# Patient Record
Sex: FEMALE | Race: ASIAN | Employment: PART TIME | URBAN - METROPOLITAN AREA
[De-identification: names, ages, dates, MRNs, and addresses within clinical notes are randomized per-mention and may not be internally consistent; named-entity substitution may affect disease eponyms.]

---

## 2017-05-03 ENCOUNTER — TELEPHONE (OUTPATIENT)
Dept: FAMILY MEDICINE CLINIC | Facility: CLINIC | Age: 47
End: 2017-05-03

## 2017-05-04 ENCOUNTER — OFFICE VISIT (OUTPATIENT)
Dept: FAMILY MEDICINE CLINIC | Facility: CLINIC | Age: 47
End: 2017-05-04

## 2017-05-04 ENCOUNTER — TELEPHONE (OUTPATIENT)
Dept: FAMILY MEDICINE CLINIC | Facility: CLINIC | Age: 47
End: 2017-05-04

## 2017-05-04 VITALS
DIASTOLIC BLOOD PRESSURE: 68 MMHG | HEART RATE: 58 BPM | SYSTOLIC BLOOD PRESSURE: 101 MMHG | BODY MASS INDEX: 25.72 KG/M2 | WEIGHT: 138 LBS | TEMPERATURE: 98 F | HEIGHT: 61.5 IN | RESPIRATION RATE: 18 BRPM

## 2017-05-04 DIAGNOSIS — R10.30 LOWER ABDOMINAL PAIN: ICD-10-CM

## 2017-05-04 PROCEDURE — 99212 OFFICE O/P EST SF 10 MIN: CPT | Performed by: FAMILY MEDICINE

## 2017-05-04 PROCEDURE — 99213 OFFICE O/P EST LOW 20 MIN: CPT | Performed by: FAMILY MEDICINE

## 2017-05-04 NOTE — TELEPHONE ENCOUNTER
Actions Requested: Pt is calling for further testing, unsure if  can order without seeing her - Pt was advised to go to the ED, declines at this time. Scheduled for appt at 4:20pm today.  Please advise with your recommendations Dr. Keara Bonner clinic contact information, hours of operation and will call back if needed. Patient was able to restate instructions in their own words, verbalizes understanding and agrees with plan.   Protocol Used: Abdominal Pain - Female (Adult)  Protocol-Based Disposi

## 2017-05-04 NOTE — PROGRESS NOTES
HPI:    Patient ID: Jud Wayne is a 40year old female. HPI Comments: Pt presents with intermittent right lower abdominal pains. Pt has had several episodes over the last several months. Pt had CT scan done last year which was unremarkable.  Pt had anot Visit:  No prescriptions requested or ordered in this encounter    Imaging & Referrals:  US PELVIS (TRANSABDOMINAL PELVIS)  ([id number])       QE#[id number]

## 2017-05-04 NOTE — TELEPHONE ENCOUNTER
Pt states has abdominal pain  Seen Dr Mauro Holter previously- requested CT-see 5/3 encounter  Was told to call if pain returned    Call transferred [facility]

## 2017-05-04 NOTE — TELEPHONE ENCOUNTER
Message noted and needs to be seen by someone for evaluation and decision on what testing. See if she can be seen by someone today or with me today if no one else has an appointment.

## 2017-05-17 ENCOUNTER — OFFICE VISIT (OUTPATIENT)
Dept: OBGYN CLINIC | Facility: CLINIC | Age: 47
End: 2017-05-17

## 2017-05-17 VITALS
BODY MASS INDEX: 25 KG/M2 | HEART RATE: 76 BPM | DIASTOLIC BLOOD PRESSURE: 76 MMHG | WEIGHT: 135 LBS | SYSTOLIC BLOOD PRESSURE: 110 MMHG

## 2017-05-17 DIAGNOSIS — R10.31 ABDOMINAL PAIN, RIGHT LOWER QUADRANT: ICD-10-CM

## 2017-05-17 DIAGNOSIS — Z12.31 ENCOUNTER FOR SCREENING MAMMOGRAM FOR BREAST CANCER: ICD-10-CM

## 2017-05-17 DIAGNOSIS — Z01.419 WELL WOMAN EXAM WITH ROUTINE GYNECOLOGICAL EXAM: Primary | ICD-10-CM

## 2017-05-17 DIAGNOSIS — R53.81 COMPLAINT OF DEBILITY AND MALAISE: ICD-10-CM

## 2017-05-17 DIAGNOSIS — N92.6 IRREGULAR MENSES: ICD-10-CM

## 2017-05-17 PROCEDURE — 99386 PREV VISIT NEW AGE 40-64: CPT | Performed by: ADVANCED PRACTICE MIDWIFE

## 2017-05-17 NOTE — PROGRESS NOTES
HPI:    Patient ID: Rupali Sy is a 40year old female. Here for annual exam and evaluation. Has recently been having right sided lower abdominal pain that is sharp and knife like. Was evaluated with a CT scan and all findings were negative.   States sh Constitutional: Negative. HENT: Negative. Eyes: Negative. Respiratory: Negative. Cardiovascular: Negative. Gastrointestinal: Positive for abdominal pain. Genitourinary: Negative. Musculoskeletal: Negative. Skin: Negative.     Neurol discharge and no friability. Right adnexum displays no mass, no tenderness and no fullness. Left adnexum displays no mass, no tenderness and no fullness. No vaginal discharge found.    RV uterus that is about 8 weeks size  Right side firm and thick to palpa

## 2017-05-30 ENCOUNTER — HOSPITAL ENCOUNTER (OUTPATIENT)
Dept: ULTRASOUND IMAGING | Facility: HOSPITAL | Age: 47
Discharge: HOME | End: 2017-05-30
Attending: FAMILY MEDICINE
Payer: COMMERCIAL

## 2017-05-30 DIAGNOSIS — R10.30 LOWER ABDOMINAL PAIN: ICD-10-CM

## 2017-05-30 PROCEDURE — 76830 TRANSVAGINAL US NON-OB: CPT | Performed by: FAMILY MEDICINE

## 2017-05-30 PROCEDURE — 76856 US EXAM PELVIC COMPLETE: CPT | Performed by: FAMILY MEDICINE

## 2017-06-12 ENCOUNTER — TELEPHONE (OUTPATIENT)
Dept: OBGYN CLINIC | Facility: CLINIC | Age: 47
End: 2017-06-12

## 2017-06-16 ENCOUNTER — TELEPHONE (OUTPATIENT)
Dept: OBGYN CLINIC | Facility: CLINIC | Age: 47
End: 2017-06-16

## 2020-11-19 ENCOUNTER — TELEPHONE (OUTPATIENT)
Dept: INTERNAL MEDICINE CLINIC | Facility: HOSPITAL | Age: 50
End: 2020-11-19

## 2020-11-19 DIAGNOSIS — Z20.822 SUSPECTED COVID-19 VIRUS INFECTION: Primary | ICD-10-CM

## 2020-11-20 ENCOUNTER — TELEPHONE (OUTPATIENT)
Dept: INTERNAL MEDICINE CLINIC | Facility: HOSPITAL | Age: 50
End: 2020-11-20

## 2020-11-20 ENCOUNTER — LAB ENCOUNTER (OUTPATIENT)
Dept: LAB | Age: 50
End: 2020-11-20
Attending: PREVENTIVE MEDICINE
Payer: COMMERCIAL

## 2020-11-20 DIAGNOSIS — Z20.822 EXPOSURE TO COVID-19 VIRUS: Primary | ICD-10-CM

## 2020-11-20 DIAGNOSIS — Z20.822 SUSPECTED COVID-19 VIRUS INFECTION: ICD-10-CM

## 2020-11-23 ENCOUNTER — LAB ENCOUNTER (OUTPATIENT)
Dept: LAB | Age: 50
End: 2020-11-23
Attending: PREVENTIVE MEDICINE
Payer: COMMERCIAL

## 2020-11-23 DIAGNOSIS — Z20.822 EXPOSURE TO COVID-19 VIRUS: ICD-10-CM

## 2020-11-24 NOTE — PROGRESS NOTES
Message left on voicemail with results and results were reviewed by employee via [place].  See telephone encounter

## 2020-11-24 NOTE — TELEPHONE ENCOUNTER
Results and RTW guidelines:    COVID RESULT GIVEN:    []POSITIVE     [x] NEGATIVE       Notes: Employee also had a Negative Rapid on 11/20/20  RTW PLAN:    [] RTW 10 days with clearance from [facility]- call for appt 2 days prior to RTW date  [] RTW immed

## 2020-12-19 ENCOUNTER — IMMUNIZATION (OUTPATIENT)
Dept: LAB | Facility: HOSPITAL | Age: 50
End: 2020-12-19
Attending: PREVENTIVE MEDICINE
Payer: COMMERCIAL

## 2020-12-19 DIAGNOSIS — Z23 NEED FOR VACCINATION: ICD-10-CM

## 2020-12-19 PROCEDURE — 0001A PFIZER-BIONTECH COVID-19 VACCINE: CPT | Performed by: ADVANCED PRACTICE MIDWIFE

## 2021-01-09 ENCOUNTER — IMMUNIZATION (OUTPATIENT)
Dept: LAB | Facility: HOSPITAL | Age: 51
End: 2021-01-09
Attending: PREVENTIVE MEDICINE
Payer: COMMERCIAL

## 2021-01-09 DIAGNOSIS — Z23 NEED FOR VACCINATION: ICD-10-CM

## 2021-01-09 PROCEDURE — 0002A SARSCOV2 VAC 30MCG/0.3ML IM: CPT

## 2021-01-26 ENCOUNTER — OFFICE VISIT (OUTPATIENT)
Dept: INTEGRATIVE MEDICINE | Facility: CLINIC | Age: 51
End: 2021-01-26
Payer: COMMERCIAL

## 2021-01-26 VITALS
HEART RATE: 66 BPM | HEIGHT: 62 IN | OXYGEN SATURATION: 98 % | DIASTOLIC BLOOD PRESSURE: 70 MMHG | SYSTOLIC BLOOD PRESSURE: 106 MMHG | BODY MASS INDEX: 26.72 KG/M2 | WEIGHT: 145.19 LBS

## 2021-01-26 DIAGNOSIS — F41.9 ANXIETY: ICD-10-CM

## 2021-01-26 DIAGNOSIS — F51.01 PRIMARY INSOMNIA: ICD-10-CM

## 2021-01-26 DIAGNOSIS — Z71.89 ENCOUNTER FOR HERB AND VITAMIN SUPPLEMENT MANAGEMENT: ICD-10-CM

## 2021-01-26 DIAGNOSIS — Z78.0 MENOPAUSE: Primary | ICD-10-CM

## 2021-01-26 PROBLEM — R53.81 COMPLAINT OF DEBILITY AND MALAISE: Status: RESOLVED | Noted: 2017-05-17 | Resolved: 2021-01-26

## 2021-01-26 PROBLEM — R10.31 ABDOMINAL PAIN, RIGHT LOWER QUADRANT: Status: RESOLVED | Noted: 2017-05-17 | Resolved: 2021-01-26

## 2021-01-26 PROBLEM — N92.6 IRREGULAR MENSES: Status: RESOLVED | Noted: 2017-05-17 | Resolved: 2021-01-26

## 2021-01-26 PROCEDURE — 3078F DIAST BP <80 MM HG: CPT | Performed by: FAMILY MEDICINE

## 2021-01-26 PROCEDURE — 99204 OFFICE O/P NEW MOD 45 MIN: CPT | Performed by: FAMILY MEDICINE

## 2021-01-26 PROCEDURE — 3074F SYST BP LT 130 MM HG: CPT | Performed by: FAMILY MEDICINE

## 2021-01-26 PROCEDURE — 3008F BODY MASS INDEX DOCD: CPT | Performed by: FAMILY MEDICINE

## 2021-01-26 RX ORDER — ESCITALOPRAM OXALATE 5 MG/1
2.5 TABLET ORAL DAILY
Qty: 45 TABLET | Refills: 0 | Status: SHIPPED | OUTPATIENT
Start: 2021-01-26 | End: 2021-09-20

## 2021-01-26 NOTE — PATIENT INSTRUCTIONS
I have complete noel in the body's ability to heal and transform. The products and items listed below (the “Products”)  and their claims have not been evaluated by the Food and Drug Administration.  Dietary products are not intended to treat, prevent, m Whole Foods or Fruitful Yield       Triple Complex Mood Tonic™ for Balanced Emotions    Directions: Mix drops in 1/4 cup of water and sip slowly or put drops directly into the mouth. Daily: Take 0.50 ml in 1/4 cup water 2 times daily.   Where to find: ww spent in meditation gradually. Conscious breathing: Breathe in through your nose for 6 seconds, then out through your nose for 6 seconds.  Create a cycle with your breathing, where the end of the inhalation is the beginning of the exhalation without uyen online:  https://www.rositaAdapt.org/. com/DownloadMeditations. html  Graspr.Firecomms.pt. com  http://renetta.[facility]/body. cfm?id=22  http://www. GoGoVan.Firecomms/ccl/guided-meditations  http://www. Fetchnotes.Firecomms/cms/free-audio-meditations  [place].

## 2021-01-26 NOTE — PROGRESS NOTES
Alisha Crespo is a 53year old female. Patient presents with:  New Patient  Establish Care      HPI:     Feeling extra stressed. Daughter has depression. Was partially hospitalized. Stress is extra at work with the pandemic. Working nights.   Sleep - not Binge frequency: Never        Comment: 1-2/week      Drug use: No      Sexual activity: Yes        Partners: Male        Birth control/protection: Condom    Lifestyle      Physical activity        Days per week: Not on file        Minutes per session 1. Menopause    2. Anxiety    3. Primary insomnia    4. Encounter for herb and vitamin supplement management    Referred for counseling. Counseled on and discussed supplement recommendations in detail.   Start Lexapro 2.5 mg daily    Counseled on stress m Where to Find: Joyice Piano. com/Jobyduealer  Why: Settle down with your favorite pillow and blanket; experience deep, restful sleep. Feel refreshed and rested every time you wake.     Apps to Improve Sleep  - CBT-I   - Sleep    Rescue Remedy by Lidia Spann · If you have specific Episcopalian beliefs, prayer can be a form of meditation. Repeating prayers or mantras can instill a sense of peace (use a rosary bead or kvng). · Gratitude can instill a sense of peace.  Keep a gratitude journal and write down at leas 2. Close your mouth and inhale quietly through your nose to a mental count of 4.    3. Hold your breath for a count of 7.    4. Exhale completely through your mouth, making a whoosh sound to a count of 8. This is one breath.  Now inhale again and repeat

## 2021-03-01 NOTE — TELEPHONE ENCOUNTER
LMTCB, patient has overdue labs placed on 5/17/17 by [facility].  Please see if patient is planning on getting testing done
Lmtcb, called pt x3 with no response, cease f/u?
Pt is off of work until Thursday, do you want us to give her a call then?
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 See if you can reach her there.
lmtcb
want to get tested for pregnancy

## 2021-04-29 ENCOUNTER — TELEPHONE (OUTPATIENT)
Dept: INTEGRATIVE MEDICINE | Facility: CLINIC | Age: 51
End: 2021-04-29

## 2021-04-29 RX ORDER — ESCITALOPRAM OXALATE 5 MG/1
TABLET ORAL
Qty: 45 TABLET | Refills: 0 | OUTPATIENT
Start: 2021-04-29

## 2021-09-20 ENCOUNTER — OFFICE VISIT (OUTPATIENT)
Dept: INTEGRATIVE MEDICINE | Facility: CLINIC | Age: 51
End: 2021-09-20
Payer: COMMERCIAL

## 2021-09-20 VITALS
OXYGEN SATURATION: 99 % | WEIGHT: 146.38 LBS | BODY MASS INDEX: 26.94 KG/M2 | HEART RATE: 60 BPM | HEIGHT: 62 IN | DIASTOLIC BLOOD PRESSURE: 60 MMHG | SYSTOLIC BLOOD PRESSURE: 104 MMHG

## 2021-09-20 DIAGNOSIS — Z71.89 ENCOUNTER FOR HERB AND VITAMIN SUPPLEMENT MANAGEMENT: ICD-10-CM

## 2021-09-20 DIAGNOSIS — Z12.11 SCREEN FOR COLON CANCER: ICD-10-CM

## 2021-09-20 DIAGNOSIS — Z12.31 BREAST CANCER SCREENING BY MAMMOGRAM: ICD-10-CM

## 2021-09-20 DIAGNOSIS — F51.01 PRIMARY INSOMNIA: ICD-10-CM

## 2021-09-20 DIAGNOSIS — Z01.89 ENCOUNTER FOR ROUTINE LABORATORY TESTING: ICD-10-CM

## 2021-09-20 DIAGNOSIS — F41.9 ANXIETY: ICD-10-CM

## 2021-09-20 DIAGNOSIS — Z78.0 MENOPAUSE: Primary | ICD-10-CM

## 2021-09-20 PROCEDURE — 3074F SYST BP LT 130 MM HG: CPT | Performed by: FAMILY MEDICINE

## 2021-09-20 PROCEDURE — 3078F DIAST BP <80 MM HG: CPT | Performed by: FAMILY MEDICINE

## 2021-09-20 PROCEDURE — 3008F BODY MASS INDEX DOCD: CPT | Performed by: FAMILY MEDICINE

## 2021-09-20 PROCEDURE — 99214 OFFICE O/P EST MOD 30 MIN: CPT | Performed by: FAMILY MEDICINE

## 2021-09-20 RX ORDER — ESCITALOPRAM OXALATE 5 MG/1
5 TABLET ORAL DAILY
Qty: 60 TABLET | Refills: 0 | Status: SHIPPED | OUTPATIENT
Start: 2021-09-20

## 2021-09-20 NOTE — PROGRESS NOTES
Florencio Perez is a 49year old female. Patient presents with: Integrative Medicine Consult      HPI:     Has been seeing counselor. Hasn't been taking lexapro regularly. Feeling sad and anxious, feels on edge. Sleep is ok, works nights.      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 Skin:     General: Skin is warm and dry. Neurological:      Mental Status: She is alert and oriented to person, place, and time. Cranial Nerves: No cranial nerve deficit. Gait: Gait is intact.    Psychiatric:         Mood and Affe (14); Future  - CBC WITH DIFFERENTIAL WITH PLATELET  - VITAMIN D, 25-HYDROXY; Future  - VITAMIN B6; Future  - VITAMIN B12; Future  - TSH W REFLEX TO FREE T4; Future  - HEMOGLOBIN A1C; Future  - LIPID PANEL;  Future  - FERRITIN; Future    Routine fasting lab

## 2022-05-07 ENCOUNTER — TELEPHONE (OUTPATIENT)
Dept: INTERNAL MEDICINE CLINIC | Facility: HOSPITAL | Age: 52
End: 2022-05-07

## 2022-05-07 ENCOUNTER — LAB ENCOUNTER (OUTPATIENT)
Dept: LAB | Age: 52
End: 2022-05-07
Attending: PREVENTIVE MEDICINE
Payer: COMMERCIAL

## 2022-05-07 DIAGNOSIS — Z20.822 SUSPECTED COVID-19 VIRUS INFECTION: ICD-10-CM

## 2022-05-07 LAB — SARS-COV-2 RNA RESP QL NAA+PROBE: NOT DETECTED

## 2022-05-10 ENCOUNTER — LAB ENCOUNTER (OUTPATIENT)
Dept: LAB | Age: 52
End: 2022-05-10
Attending: PREVENTIVE MEDICINE
Payer: COMMERCIAL

## 2022-05-10 DIAGNOSIS — Z20.822 SUSPECTED COVID-19 VIRUS INFECTION: ICD-10-CM

## 2022-05-10 NOTE — TELEPHONE ENCOUNTER
Outside Covid Testing done    Results and RTW guidelines:    Waiting for results to send email to manager. COVID RESULT reported:      Test type:    [x] Rapid outside         [] PCR outside       [] Home Test    Date of test: 5/8    Test location:   labs     [] Result viewed in Epic with verbal consent received from employee     [] Results per Employee Covid Dashboard    [] Best Practice    [x] Employee instructed to email copy of result to [email or web address].          [x] Positive   Is employee unvaccinated? Yes []   No [x]          If yes:  Enter Covid Positive medical exemption on Exemption spreadsheet    Did you have close contact with someone on your unit while not wearing a mask? (e.g., during meal breaks):  Yes []   No [x]     If yes, who:    - Employee should quarantine at home for at least 5 days (day 1 is day after sx onset) , follow the CDC guidelines for cleaning and                              quarantining; see CDC.gov   -This employee may RTW on day 6 if asymptomatic or mildly symptomatic (with improving symptoms). Call Employee Health on day 5 if unable to return on                      day 6 after symptom onset.    -This employee needs to call Employee Health on day 5 after symptom onset. The employee needs to be cleared by Employee Health. - Monitor sx and temperature    - Employee should quarantine at home for at least 5 days from sx onset, follow the CDC guidelines for cleaning and quarantining; see CDC. gov                  - Notify PCP of result                 - Seek emergent care with worsening symptoms   - If Employee is still experiencing severe symptoms must make a RTW appt with Employee Health, Employee will not be cleared if:    1. Has consistent cough, shortness of breath or fatigue that restricts your physical activities    2. Is still feeling \"unwell\"    3. Within 15 days of hospitalization for COVID    4. Within 20 days of intubation for COVID    5.  Still has a fever, vomiting or diarrhea   - Keep communication open with management about RTW and if symptoms worsen     Notes:     RTW PLAN:    [x]  If COVID positive results, off work minimum of 5 days from positive test or onset of symptoms (day 0)        On day 5, if asymptomatic or mildly symptomatic (with improving symptoms) may return to work day 6          On day 5, if symptomatic, call Employee Health for RTW screening        []  COVID positive result - call Employee Health on day 5 after symptom onset. The employee needs to be cleared by Employee Health to RTW. [] RTW immediately, continue to monitor for sx  [] RTW when sx improve; must be fever free for 24 hours w/o medications, Diarrhea/Vomiting free for 24 hours w/o medications  [] Rapid ordered to confirm home positive  [] Alinity ordered; continue to monitor sx and call for new/worsening sx.   Discuss RTW guidelines with manager  [x] May continue to work  [] Follow up with PCP  [] Home until further instruction from hotline with Alinity results  INSTRUCTIONS PROVIDED:  [x]  Plan as noted above  []  Length of time to obtain results  []  May return to work if views negative in My Chart and  remains fever, vomiting, and diarrhea free  []  May continue to work if remains asymptomatic   [x]  Quarantine instructions  [x]  Masking protocol  [x]  S/S of worsening infection/condition and importance of prompt medical re-evaluation including when to seek emergency care  [] If symptoms develop, stay home and call hotline for rapid test order    Estimated RTW date:  5/12  [] Manager notified

## 2022-05-11 LAB — SARS-COV-2 RNA RESP QL NAA+PROBE: DETECTED

## 2022-05-11 NOTE — TELEPHONE ENCOUNTER
Results and RTW guidelines:    COVID RESULT:    [x] Viewed by employee in [address] RTW plan and instructions as indicated on triage call. Manager notified. Estimated RTW date: 5/12/22   [x] Discussed with employee   [] Unable to reach by phone. Sent via [employer] message      Test type:    [] Rapid         [x] Alinity         [] Outside test:     [x] Positive  Is employee unvaccinated? Yes []   No [x]          If yes:  Enter Covid Positive Medical exemption on Exemption Spreadsheet    Did you have close contact with someone on your unit while not wearing a mask? (e.g., during meal breaks):  Yes []   No [x]     If yes, who:     - Employee should quarantine at home for at least 5 days (day 1 is day after sx onset) , follow the CDC guidelines for cleaning and                              quarantining; see CDC.gov   -This employee may RTW on day 6 if asymptomatic or mildly symptomatic (with improving symptoms). Call Employee Health on day 5 if unable to return on day 6 after                      symptom onset.    -This employee needs to call [employer] on day 5 after symptom onset. The employee needs to be cleared by [employer]. - Monitor symptoms and temperature                 - Notify PCP of result                 - Seek emergent care with worsening symptoms   - If employee is still experiencing severe symptoms on day 5 must make a RTW appt with [employer], Employee will not be cleared if:    1. Has consistent cough, shortness of breath or fatigue that restricts your physical activities    2. Is still feeling \"unwell\"    3. Within 15 days of hospitalization for COVID    4. Within 20 days of intubation for COVID    5.  Still has a fever, vomiting or diarrhea   - Keep communication open with management about RTW and if symptoms worsen                - If outside testing completed, bring a copy of result to RTW appointment           Notes:     RTW PLAN:    [x]  If COVID positive results, off work minimum of 5 days from positive test or onset of symptoms (day 0)        On day 5, if asymptomatic or mildly symptomatic (with improving symptoms) may return to work day 6          On day 5, if symptomatic, call Employee Health for RTW screening        []  COVID positive result - call Employee Health on day 5 after symptom onset. The employee needs to be cleared by Employee Health to RTW. [] RTW immediately, continue to monitor for sx  [] RTW when sx improve; must be fever free for 24 hours w/o medications, Diarrhea/Vomiting free for 24 hours w/o medications  [] Alinity ordered; continue to monitor sx and call for new/worsening sx.   Discuss RTW guidelines with manager  [] May continue to work  [] Follow up with PCP  [] Home until further instruction from hotline with Alinity results  INSTRUCTIONS PROVIDED:  [x]  Plan as noted above  []  Length of time to obtain results   [x]  Quarantine instructions  [x]  Masking protocol   [x]  S/S of worsening infection/condition and importance of prompt medical re-evaluation including when to seek emergency care  [] If symptoms develop, stay home and call hotline for rapid test order    Estimated RTW date:  5/12/22    [x] The employee voiced understanding of above plan/instructions  [x] Manager Notified

## 2022-11-17 ENCOUNTER — IMMUNIZATION (OUTPATIENT)
Dept: LAB | Facility: HOSPITAL | Age: 52
End: 2022-11-17
Attending: PREVENTIVE MEDICINE
Payer: COMMERCIAL

## 2022-11-17 DIAGNOSIS — Z23 NEED FOR VACCINATION: Primary | ICD-10-CM

## 2022-11-17 PROCEDURE — 90471 IMMUNIZATION ADMIN: CPT

## (undated) DIAGNOSIS — Z20.822 SUSPECTED COVID-19 VIRUS INFECTION: Primary | ICD-10-CM

## (undated) NOTE — MR AVS SNAPSHOT
[facility]  [address] [place] [address] [phone]               Thank you for choosing us for your health care visit with SEGUNDO Maurer. We are glad to serve you and happy to provide you with this summary of your visit. between 7:30am to 6pm and on Saturday between 8am and 1pm. Evening and weekend appointments for your exam are available. [facility] ([address]) [address] [facility] [address]  [facility] [place] Summaries. If you've been to the Emergency Department or your doctor's office, you can view your past visit information in [employer] by going to Visits < Visit Summaries. [employer] questions? Call [phone] for help.   [employer] is NOT to be used for urge

## (undated) NOTE — MR AVS SNAPSHOT
[facility] SPECIALTY [facility] [place] [address] [address] [address]  [phone]               Thank you for choosing us for your health care visit with Rj Garza MD.  We are glad to serve you and happy to provide you with this summary of y [place], [address], [address] [facility]  [address] [place] [address]    Akshat Whiting [address]  [address] [facility] [place], [place]    It is the patient's responsibility to check [email or web address]